# Patient Record
Sex: FEMALE | Race: WHITE | Employment: FULL TIME
[De-identification: names, ages, dates, MRNs, and addresses within clinical notes are randomized per-mention and may not be internally consistent; named-entity substitution may affect disease eponyms.]

---

## 2017-08-05 ENCOUNTER — HEALTH MAINTENANCE LETTER (OUTPATIENT)
Age: 32
End: 2017-08-05

## 2018-02-09 ENCOUNTER — TELEPHONE (OUTPATIENT)
Dept: FAMILY MEDICINE | Facility: CLINIC | Age: 33
End: 2018-02-09

## 2018-02-09 NOTE — TELEPHONE ENCOUNTER
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 Urgent care?      "

## 2018-04-05 ENCOUNTER — OFFICE VISIT (OUTPATIENT)
Dept: FAMILY MEDICINE | Facility: CLINIC | Age: 33
End: 2018-04-05
Payer: COMMERCIAL

## 2018-04-05 VITALS
SYSTOLIC BLOOD PRESSURE: 125 MMHG | BODY MASS INDEX: 26.12 KG/M2 | RESPIRATION RATE: 16 BRPM | TEMPERATURE: 97.7 F | DIASTOLIC BLOOD PRESSURE: 86 MMHG | HEIGHT: 64 IN | WEIGHT: 153 LBS | HEART RATE: 86 BPM

## 2018-04-05 DIAGNOSIS — Z86.39 HISTORY OF THYROID DISEASE: ICD-10-CM

## 2018-04-05 DIAGNOSIS — G43.109 MIGRAINE WITH AURA AND WITHOUT STATUS MIGRAINOSUS, NOT INTRACTABLE: Primary | ICD-10-CM

## 2018-04-05 PROCEDURE — 36415 COLL VENOUS BLD VENIPUNCTURE: CPT | Performed by: FAMILY MEDICINE

## 2018-04-05 PROCEDURE — 99214 OFFICE O/P EST MOD 30 MIN: CPT | Performed by: FAMILY MEDICINE

## 2018-04-05 PROCEDURE — 84443 ASSAY THYROID STIM HORMONE: CPT | Performed by: FAMILY MEDICINE

## 2018-04-05 RX ORDER — SUMATRIPTAN 100 MG/1
100 TABLET, FILM COATED ORAL
Qty: 9 TABLET | Refills: 1 | Status: SHIPPED | OUTPATIENT
Start: 2018-04-05

## 2018-04-05 NOTE — NURSING NOTE
"Chief Complaint   Patient presents with     Headache     x 2 months        Initial /86 (BP Location: Right arm, Patient Position: Chair, Cuff Size: Adult Large)  Pulse 86  Temp 97.7  F (36.5  C) (Oral)  Resp 16  Ht 5' 4\" (1.626 m)  Wt 153 lb (69.4 kg)  Breastfeeding? No  BMI 26.26 kg/m2 Estimated body mass index is 26.26 kg/(m^2) as calculated from the following:    Height as of this encounter: 5' 4\" (1.626 m).    Weight as of this encounter: 153 lb (69.4 kg).  Medication Reconciliation: complete rt arm Irasema Alves MA      "

## 2018-04-05 NOTE — LETTER
My Migraine Action Plan      Date: 4/5/2018     My Name: Ela Dean   YOB: 1985  My Pharmacy: [id number] - [facility] [place] [address]  [address]       My (Preventative) Control Medicine: none        My Rescue Medicine: sumatriptan   My Doctor: Artie Isaacs     My Clinic: [address] [id number] [phone]        GREEN ZONE = Good Control    My headache plan is working.   I can do what I need to do.           I WILL:     ? Keep managing my triggers.  ? Write in my migraine diary each time I have a headache.  ? Keep taking my preventive (controller) medicine daily.  ? Take my relief and rescue medicine as needed.             YELLOW ZONE = Not Enough Control    My headache plan isn t always working.   My headaches keep me from doing   some of the things I need to do.       I WILL:     ? Set goals to control my triggers and act on them.  ? Write in my migraine diary each time I have a headache and review it for                      patterns or new triggers.  ? Keep taking my preventive (controller) medicine daily.  ? Take my relief and rescue medicine as needed.  ? Call my doctor or clinic at if I stay in the Yellow Zone.             RED ZONE = Poor or No Control    My headache plan has  failed. I can t do anything  when I have one. My  medicines aren t working.           I WILL:   ? Set goals to control my triggers and act on them.  ? Write in my migraine diary each time I have a headache and review it for                      patterns or new triggers.  ? Keep taking my preventive (controller) medicine daily.  ? Take my relief and rescue medicine as needed.  ? Call my doctor or clinic or go to urgent care or an ER if I m having the worst                  headache of my life.  ? Call my doctor or clinic or go to urgent care or an ER if my medicine doesn t work.  ? Let my doctor or clinic  know within 2 weeks if I have gone to an urgent care or             emergency department.          Provider specific instructions:

## 2018-04-05 NOTE — PROGRESS NOTES
SUBJECTIVE:   Ela Dean is a 33 year old female who presents to clinic today for the following health issues:      Migraines x 2 months      Migraine with aura and without status migrainosus, not intractable  (primary encounter diagnosis): Patient states she has struggled with headaches most of her life. She recalls getting many different medications when she was in her teens due to daily headaches but since has just gone to sleep when she feels a migraine. She went several years without migraines but 2 months ago she got a migraine and has been getting them about 3 times per month since.     History of thyroid disease: Patient is aware of family history of thyroid disease but was not aware that she was diagnosed with Grave's disease while pregnant in 2007.  Lab studies were dramatic, normalized subsequently  TSH   Date Value Ref Range Status   09/25/2007 1.44 0.4 - 5.0 mU/L Final   11/22/2006 0.02 (L) 0.4 - 5.0 mU/L Final   11/09/2006 0.03 (L) 0.4 - 5.0 mU/L Final   09/15/2004 0.48 0.4 - 5.0 mU/L Final     No therapies were undertaken      Problem list and histories reviewed & adjusted, as indicated.  Additional history: none    Reviewed and updated as needed this visit by clinical staff  Tobacco  Allergies  Meds  Med Hx  Surg Hx  Fam Hx  Soc Hx       Past Medical History:   Diagnosis Date     ADD (attention deficit disorder)      Anxiety 11/25/2008     Attention deficit disorder of adult 5/31/2013     CARDIOVASCULAR SCREENING; LDL GOAL LESS THAN 160 10/31/2010     Cervicalgia 9/9/2009     Contact dermatitis and other eczema, due to unspecified cause      DYSPLASIA OF CERVIX NOS 7/14/2006     Esophageal reflux 7/14/2006     Lumbago 9/9/2009     Mild major depression (H) 1/16/2013     NONSPECIFIC MEDICAL HISTORY     broken fingers digit 2 and 5.     Pain in thoracic spine 9/9/2009     PROPHYLACTIC IMMUNOTHERAPY 7/14/2006     THYROTOX NOS NO CRISIS 11/28/2006     Variants of migraine, not elsewhere  "classified, without mention of intractable migraine without mention of status migrainosus        History reviewed. No pertinent surgical history.    Family History   Problem Relation Age of Onset     Family History Negative Mother      Family History Negative Father      Cardiovascular Father      Had stent placed     CANCER Maternal Grandfather      Brain and lung CA     Cardiovascular Paternal Grandfather       of heart attack     DIABETES Sister        Social History   Substance Use Topics     Smoking status: Light Tobacco Smoker     Years: 5.00     Last attempt to quit: 2009     Smokeless tobacco: Never Used      Comment: 2-4 cigs per day     Alcohol use No       Reviewed and updated as needed this visit by Provider         ROS:  Positive for aura and nausea at migraine onset.  No fevers no thyroid symptoms.  anxiety is increasing    This document serves as a record of the services and decisions personally performed and made by Artie Isaacs MD. It was created on his behalf by Manda Lennon, a trained medical scribe.  The creation of this document is based on the scribe's personal observations and the provider's statements to the medical scribe.  Manda Lennon, 2018 3:57 PM    OBJECTIVE:     /86 (BP Location: Right arm, Patient Position: Chair, Cuff Size: Adult Large)  Pulse 86  Temp 97.7  F (36.5  C) (Oral)  Resp 16  Ht 1.626 m (5' 4\")  Wt 69.4 kg (153 lb)  Breastfeeding? No  BMI 26.26 kg/m2  Body mass index is 26.26 kg/(m^2).  CN II-XII grossly symmetric   No thyromegaly, trachea midline          ASSESSMENT/PLAN:     ASSESSMENT / PLAN:  (G43.109) Migraine with aura and without status migrainosus, not intractable  (primary encounter diagnosis)  Comment: Treat specifically   Plan: SUMAtriptan (IMITREX) 100 MG tablet            (Z86.39) History of thyroid disease  Comment: Current status unknown  TSH   Date Value Ref Range Status   2007 1.44 0.4 - 5.0 mU/L Final   ]    Plan: TSH " with free T4 reflex         RTC 2 mos  The information in this document, created by the medical scribe for me, accurately reflects the services I personally performed and the decisions made by me. I have reviewed and approved this document for accuracy prior to leaving the patient care area.  Artie Isaacs MD April 5, 2018 3:57 PM    Artie Isaacs MD  [facility]

## 2018-04-05 NOTE — LETTER
April 9, 2018      Ela Dean  [address] [address]        Dear ,    We are writing to inform you of your test results.    Tests are all normal. Great!    Resulted Orders   TSH with free T4 reflex   Result Value Ref Range    TSH 1.52 0.40 - 4.00 mU/L       If you have any questions or concerns, please call the clinic at the number listed above.       Sincerely,        Artie Isaacs MD

## 2018-04-05 NOTE — MR AVS SNAPSHOT
"              After Visit Summary   2018    Ela Dean    MRN: [id number]           Patient Information     Date Of Birth          1985        Visit Information        Provider Department      2018 3:30 PM Artie Isaacs MD [facility]        Today's Diagnoses     Migraine with aura and without status migrainosus, not intractable    -  1    History of thyroid disease           Follow-ups after your visit        Follow-up notes from your care team     Return in about 2 months (around 2018).      Who to contact     If you have questions or need follow up information about today's clinic visit or your schedule please contact [facility] directly at [phone].  Normal or non-critical lab and imaging results will be communicated to you by MyChart, letter or phone within 4 business days after the clinic has received the results. If you do not hear from us within 7 days, please contact the clinic through MyChart or phone. If you have a critical or abnormal lab result, we will notify you by phone as soon as possible.  Submit refill requests through [employer] or call your pharmacy and they will forward the refill request to us. Please allow 3 business days for your refill to be completed.          Additional Information About Your Visit        MyChart Information     [employer] lets you send messages to your doctor, view your test results, renew your prescriptions, schedule appointments and more. To sign up, go to [email or web address] . Click on \"Log in\" on the left side of the screen, which will take you to the Welcome page. Then click on \"Sign up Now\" on the right side of the page.     You will be asked to enter the access code listed below, as well as some personal information. Please follow the directions to create your username and password.     Your access code is: [email or web address]  Expires: 2018  4:13 PM     Your access code will  in 90 days. If you " "need help or a new code, please call your [facility] clinic or [phone].        Care EveryWhere ID     This is your Care EveryWhere ID. This could be used by other organizations to access your [facility] medical records  [id number]        Your Vitals Were     Pulse Temperature Respirations Height Breastfeeding? BMI (Body Mass Index)    86 97.7  F (36.5  C) (Oral) 16 5' 4\" (1.626 m) No 26.26 kg/m2       Blood Pressure from Last 3 Encounters:   04/05/18 125/86   06/11/14 110/60   06/07/14 95/53    Weight from Last 3 Encounters:   04/05/18 153 lb (69.4 kg)   06/11/14 151 lb (68.5 kg)   05/30/14 149 lb (67.6 kg)              We Performed the Following     MIGRAINE ACTION PLAN     TSH with free T4 reflex          Today's Medication Changes          These changes are accurate as of 4/5/18  9:22 PM.  If you have any questions, ask your nurse or doctor.               Start taking these medicines.        Dose/Directions    SUMAtriptan 100 MG tablet   Commonly known as:  IMITREX   Used for:  Migraine with aura and without status migrainosus, not intractable   Started by:  Artie Isaacs MD        Dose:  100 mg   Take 1 tablet (100 mg) by mouth at onset of headache for migraine May repeat in 2 hours. Max 2 tablets/24 hours.   Quantity:  9 tablet   Refills:  1            Where to get your medicines      These medications were sent to [facility] [address] [place] [id number]  [address]  [address], [facility] [address]     Phone:  [phone]     SUMAtriptan 100 MG tablet                Primary Care Provider Office Phone # Fax #    Artie Isaacs [phone] [address] [facility] [address]        Equal Access to Services     MOISES PRESTON : Sean villalba Sonichole, waaxda luqadaha, qaybta kaalmada adeegyajody, jean penn. Formerly [facility] [phone].    ATENCIÓN: Si habla español, tiene a tristan disposición " servicios gratuitos de asistencia lingüística. Jhonny ohara [phone].    We comply with applicable federal civil rights laws and [place] laws. We do not discriminate on the basis of race, color, national origin, age, disability, sex, sexual orientation, or gender identity.            Thank you!     Thank you for choosing [facility]  for your care. Our goal is always to provide you with excellent care. Hearing back from our patients is one way we can continue to improve our services. Please take a few minutes to complete the written survey that you may receive in the mail after your visit with us. Thank you!             Your Updated Medication List - Protect others around you: Learn how to safely use, store and throw away your medicines at www.disposemymeds.org.          This list is accurate as of 4/5/18  9:22 PM.  Always use your most recent med list.                   Brand Name Dispense Instructions for use Diagnosis    amphetamine-dextroamphetamine 20 MG per 24 hr capsule    ADDERALL XR    28 capsule    Take 1 capsule (20 mg) by mouth daily    Attention deficit disorder of adult, ADD (attention deficit disorder)       clonazePAM 1 MG tablet    klonoPIN    60 tablet    Take 1 tablet (1 mg) by mouth 2 times daily as needed for anxiety    Anxiety       phenazopyridine 200 MG tablet    PYRIDIUM    9 tablet    Take 1 tablet (200 mg) by mouth 3 times daily as needed for irritation Expect your urine to appear bright orange    Dysuria       SUMAtriptan 100 MG tablet    IMITREX    9 tablet    Take 1 tablet (100 mg) by mouth at onset of headache for migraine May repeat in 2 hours. Max 2 tablets/24 hours.    Migraine with aura and without status migrainosus, not intractable

## 2018-04-06 LAB — TSH SERPL DL<=0.005 MIU/L-ACNC: 1.52 MU/L (ref 0.4–4)

## 2018-08-08 ENCOUNTER — HOSPITAL ENCOUNTER (OUTPATIENT)
Dept: MAMMOGRAPHY | Facility: CLINIC | Age: 33
Discharge: HOME | End: 2018-08-08
Attending: NURSE PRACTITIONER | Admitting: NURSE PRACTITIONER
Payer: COMMERCIAL

## 2018-08-08 ENCOUNTER — HOSPITAL ENCOUNTER (OUTPATIENT)
Dept: ULTRASOUND IMAGING | Facility: CLINIC | Age: 33
End: 2018-08-08
Attending: NURSE PRACTITIONER
Payer: COMMERCIAL

## 2018-08-08 DIAGNOSIS — N64.59 THICKENING OF SKIN OF BREAST: ICD-10-CM

## 2018-08-08 DIAGNOSIS — N63.20 LEFT BREAST MASS: ICD-10-CM

## 2018-08-08 PROCEDURE — 76642 ULTRASOUND BREAST LIMITED: CPT | Mod: LT

## 2018-08-08 PROCEDURE — 77066 DX MAMMO INCL CAD BI: CPT

## 2018-08-12 ENCOUNTER — HEALTH MAINTENANCE LETTER (OUTPATIENT)
Age: 33
End: 2018-08-12

## 2018-09-25 ENCOUNTER — TELEPHONE (OUTPATIENT)
Dept: FAMILY MEDICINE | Facility: CLINIC | Age: 33
End: 2018-09-25

## 2018-09-25 NOTE — TELEPHONE ENCOUNTER
9/25/2018    Call Regarding Preventive Health Screening Cervical/PAP    Attempt 1    Message on voicemail     Comments:       Outreach   CC

## 2018-10-31 NOTE — TELEPHONE ENCOUNTER
10/31/2018    Call Regarding Preventive Health Screening Cervical/PAP    Attempt 2    Message on voicemail     Comments:       Outreach   CWR

## 2018-11-06 NOTE — TELEPHONE ENCOUNTER
11/6/2018    Call Regarding Preventive Health Screening Cervical/PAP    Attempt 3    Message FULL VOICEMAIL    Comments:       Outreach   CWR

## 2019-02-26 ENCOUNTER — TELEPHONE (OUTPATIENT)
Dept: FAMILY MEDICINE | Facility: CLINIC | Age: 34
End: 2019-02-26

## 2019-02-26 NOTE — TELEPHONE ENCOUNTER
Panel Management Review      Patient has the following on her problem list: None      Composite cancer screening  Chart review shows that this patient is due/due soon for the following Pap Smear  Summary:    Patient is due/failing the following:   PAP    Action needed:   Patient needs office visit for PE/PAP.    Type of outreach:    panel    Questions for provider review:    None                                                                                                                                    Toyin Martinez/BLAYNE  [place]---[facility]       Chart routed to Care Team .

## 2020-11-22 ENCOUNTER — HEALTH MAINTENANCE LETTER (OUTPATIENT)
Age: 35
End: 2020-11-22

## 2021-09-19 ENCOUNTER — HEALTH MAINTENANCE LETTER (OUTPATIENT)
Age: 36
End: 2021-09-19

## 2022-01-09 ENCOUNTER — HEALTH MAINTENANCE LETTER (OUTPATIENT)
Age: 37
End: 2022-01-09

## 2022-11-20 ENCOUNTER — HEALTH MAINTENANCE LETTER (OUTPATIENT)
Age: 37
End: 2022-11-20

## 2023-04-15 ENCOUNTER — HEALTH MAINTENANCE LETTER (OUTPATIENT)
Age: 38
End: 2023-04-15